# Patient Record
Sex: FEMALE | Race: WHITE
[De-identification: names, ages, dates, MRNs, and addresses within clinical notes are randomized per-mention and may not be internally consistent; named-entity substitution may affect disease eponyms.]

---

## 2018-03-14 ENCOUNTER — HOSPITAL ENCOUNTER (OUTPATIENT)
Dept: HOSPITAL 95 - ER | Age: 33
Setting detail: OBSERVATION
Discharge: HOME | End: 2018-03-14
Attending: SURGERY | Admitting: SURGERY
Payer: COMMERCIAL

## 2018-03-14 VITALS — HEIGHT: 65 IN | BODY MASS INDEX: 39.32 KG/M2 | WEIGHT: 236 LBS

## 2018-03-14 DIAGNOSIS — F31.9: ICD-10-CM

## 2018-03-14 DIAGNOSIS — F41.9: ICD-10-CM

## 2018-03-14 DIAGNOSIS — E11.9: ICD-10-CM

## 2018-03-14 DIAGNOSIS — Z88.8: ICD-10-CM

## 2018-03-14 DIAGNOSIS — Z79.899: ICD-10-CM

## 2018-03-14 DIAGNOSIS — F17.210: ICD-10-CM

## 2018-03-14 DIAGNOSIS — K80.12: Primary | ICD-10-CM

## 2018-03-14 DIAGNOSIS — E28.2: ICD-10-CM

## 2018-03-14 DIAGNOSIS — Z88.5: ICD-10-CM

## 2018-03-14 DIAGNOSIS — A63.0: ICD-10-CM

## 2018-03-14 DIAGNOSIS — Z98.890: ICD-10-CM

## 2018-03-14 LAB
ALBUMIN SERPL BCP-MCNC: 3.4 G/DL (ref 3.4–5)
ALBUMIN/GLOB SERPL: 0.9 {RATIO} (ref 0.8–1.8)
ALT SERPL W P-5'-P-CCNC: 19 U/L (ref 12–78)
ANION GAP SERPL CALCULATED.4IONS-SCNC: 9 MMOL/L (ref 6–16)
AST SERPL W P-5'-P-CCNC: 8 U/L (ref 12–37)
BASOPHILS # BLD AUTO: 0.05 K/MM3 (ref 0–0.23)
BASOPHILS NFR BLD AUTO: 1 % (ref 0–2)
BILIRUB SERPL-MCNC: 0.1 MG/DL (ref 0.1–1)
BUN SERPL-MCNC: 16 MG/DL (ref 8–24)
CALCIUM SERPL-MCNC: 8.8 MG/DL (ref 8.5–10.1)
CHLORIDE SERPL-SCNC: 105 MMOL/L (ref 98–108)
CO2 SERPL-SCNC: 24 MMOL/L (ref 21–32)
CREAT SERPL-MCNC: 0.61 MG/DL (ref 0.4–1)
DEPRECATED RDW RBC AUTO: 43.1 FL (ref 35.1–46.3)
EOSINOPHIL # BLD AUTO: 0.07 K/MM3 (ref 0–0.68)
EOSINOPHIL NFR BLD AUTO: 1 % (ref 0–6)
ERYTHROCYTE [DISTWIDTH] IN BLOOD BY AUTOMATED COUNT: 14.4 % (ref 11.7–14.2)
GLOBULIN SER CALC-MCNC: 3.9 G/DL (ref 2.2–4)
GLUCOSE SERPL-MCNC: 106 MG/DL (ref 70–99)
HCT VFR BLD AUTO: 38.8 % (ref 33–51)
HGB BLD-MCNC: 12.5 G/DL (ref 11.5–16)
IMM GRANULOCYTES # BLD AUTO: 0.01 K/MM3 (ref 0–0.1)
IMM GRANULOCYTES NFR BLD AUTO: 0 % (ref 0–1)
LYMPHOCYTES # BLD AUTO: 2.83 K/MM3 (ref 0.84–5.2)
LYMPHOCYTES NFR BLD AUTO: 34 % (ref 21–46)
MCHC RBC AUTO-ENTMCNC: 32.2 G/DL (ref 31.5–36.5)
MCV RBC AUTO: 83 FL (ref 80–100)
MONOCYTES # BLD AUTO: 0.54 K/MM3 (ref 0.16–1.47)
MONOCYTES NFR BLD AUTO: 7 % (ref 4–13)
NEUTROPHILS # BLD AUTO: 4.87 K/MM3 (ref 1.96–9.15)
NEUTROPHILS NFR BLD AUTO: 58 % (ref 41–73)
NRBC # BLD AUTO: 0 K/MM3 (ref 0–0.02)
NRBC BLD AUTO-RTO: 0 /100 WBC (ref 0–0.2)
PLATELET # BLD AUTO: 324 K/MM3 (ref 150–400)
POTASSIUM SERPL-SCNC: 3.9 MMOL/L (ref 3.5–5.5)
PROT SERPL-MCNC: 7.3 G/DL (ref 6.4–8.2)
SODIUM SERPL-SCNC: 138 MMOL/L (ref 136–145)

## 2018-03-14 PROCEDURE — C1729 CATH, DRAINAGE: HCPCS

## 2018-03-14 PROCEDURE — 0FT44ZZ RESECTION OF GALLBLADDER, PERCUTANEOUS ENDOSCOPIC APPROACH: ICD-10-PCS | Performed by: SURGERY

## 2018-03-14 PROCEDURE — BF13YZZ FLUOROSCOPY OF GALLBLADDER AND BILE DUCTS USING OTHER CONTRAST: ICD-10-PCS | Performed by: SURGERY

## 2018-03-14 PROCEDURE — G0378 HOSPITAL OBSERVATION PER HR: HCPCS

## 2020-12-03 ENCOUNTER — HOSPITAL ENCOUNTER (OUTPATIENT)
Dept: HOSPITAL 95 - LAB | Age: 35
Discharge: HOME | End: 2020-12-03
Attending: OBSTETRICS & GYNECOLOGY
Payer: COMMERCIAL

## 2020-12-03 DIAGNOSIS — O09.522: Primary | ICD-10-CM

## 2020-12-03 DIAGNOSIS — O09.292: ICD-10-CM

## 2020-12-03 DIAGNOSIS — O24.112: ICD-10-CM

## 2020-12-03 LAB
BASOPHILS # BLD AUTO: 0.04 K/MM3 (ref 0–0.23)
BASOPHILS NFR BLD AUTO: 0 % (ref 0–2)
DEPRECATED RDW RBC AUTO: 45.3 FL (ref 35.1–46.3)
EOSINOPHIL # BLD AUTO: 0.17 K/MM3 (ref 0–0.68)
EOSINOPHIL NFR BLD AUTO: 1 % (ref 0–6)
ERYTHROCYTE [DISTWIDTH] IN BLOOD BY AUTOMATED COUNT: 13.9 % (ref 11.7–14.2)
HCT VFR BLD AUTO: 36.9 % (ref 33–51)
HGB BLD-MCNC: 11.6 G/DL (ref 11.5–16)
IMM GRANULOCYTES # BLD AUTO: 0.17 K/MM3 (ref 0–0.1)
IMM GRANULOCYTES NFR BLD AUTO: 1 % (ref 0–1)
LYMPHOCYTES # BLD AUTO: 3.52 K/MM3 (ref 0.84–5.2)
LYMPHOCYTES NFR BLD AUTO: 24 % (ref 21–46)
MCHC RBC AUTO-ENTMCNC: 31.4 G/DL (ref 31.5–36.5)
MCV RBC AUTO: 90 FL (ref 80–100)
MONOCYTES # BLD AUTO: 1.15 K/MM3 (ref 0.16–1.47)
MONOCYTES NFR BLD AUTO: 8 % (ref 4–13)
NEUTROPHILS # BLD AUTO: 9.45 K/MM3 (ref 1.96–9.15)
NEUTROPHILS NFR BLD AUTO: 65 % (ref 41–73)
NRBC # BLD AUTO: 0 K/MM3 (ref 0–0.02)
NRBC BLD AUTO-RTO: 0 /100 WBC (ref 0–0.2)
PLATELET # BLD AUTO: 263 K/MM3 (ref 150–400)

## 2021-02-09 ENCOUNTER — HOSPITAL ENCOUNTER (INPATIENT)
Dept: HOSPITAL 95 - BC | Age: 36
LOS: 2 days | Discharge: HOME | End: 2021-02-11
Attending: OBSTETRICS & GYNECOLOGY | Admitting: OBSTETRICS & GYNECOLOGY
Payer: COMMERCIAL

## 2021-02-09 VITALS — BODY MASS INDEX: 46.15 KG/M2 | HEIGHT: 65 IN | WEIGHT: 276.99 LBS

## 2021-02-09 DIAGNOSIS — E11.9: ICD-10-CM

## 2021-02-09 DIAGNOSIS — E66.9: ICD-10-CM

## 2021-02-09 DIAGNOSIS — Z3A.38: ICD-10-CM

## 2021-02-09 DIAGNOSIS — Z87.891: ICD-10-CM

## 2021-02-09 DIAGNOSIS — Z20.822: ICD-10-CM

## 2021-02-09 LAB
BASOPHILS # BLD AUTO: 0.04 K/MM3 (ref 0–0.23)
BASOPHILS NFR BLD AUTO: 0 % (ref 0–2)
DEPRECATED RDW RBC AUTO: 45.8 FL (ref 35.1–46.3)
EOSINOPHIL # BLD AUTO: 0.2 K/MM3 (ref 0–0.68)
EOSINOPHIL NFR BLD AUTO: 2 % (ref 0–6)
ERYTHROCYTE [DISTWIDTH] IN BLOOD BY AUTOMATED COUNT: 14.7 % (ref 11.7–14.2)
HCT VFR BLD AUTO: 34.5 % (ref 33–51)
HGB BLD-MCNC: 11.3 G/DL (ref 11.5–16)
IMM GRANULOCYTES # BLD AUTO: 0.12 K/MM3 (ref 0–0.1)
IMM GRANULOCYTES NFR BLD AUTO: 1 % (ref 0–1)
LYMPHOCYTES # BLD AUTO: 2.86 K/MM3 (ref 0.84–5.2)
LYMPHOCYTES NFR BLD AUTO: 23 % (ref 21–46)
MCHC RBC AUTO-ENTMCNC: 32.8 G/DL (ref 31.5–36.5)
MCV RBC AUTO: 85 FL (ref 80–100)
MONOCYTES # BLD AUTO: 0.98 K/MM3 (ref 0.16–1.47)
MONOCYTES NFR BLD AUTO: 8 % (ref 4–13)
NEUTROPHILS # BLD AUTO: 8.11 K/MM3 (ref 1.96–9.15)
NEUTROPHILS NFR BLD AUTO: 66 % (ref 41–73)
NRBC # BLD AUTO: 0 K/MM3 (ref 0–0.02)
NRBC BLD AUTO-RTO: 0 /100 WBC (ref 0–0.2)
PLATELET # BLD AUTO: 243 K/MM3 (ref 150–400)

## 2021-02-09 PROCEDURE — 0KQM0ZZ REPAIR PERINEUM MUSCLE, OPEN APPROACH: ICD-10-PCS | Performed by: OBSTETRICS & GYNECOLOGY

## 2021-02-09 PROCEDURE — A9270 NON-COVERED ITEM OR SERVICE: HCPCS

## 2021-02-09 NOTE — NUR
LATE ENTRY:RN/LC ROUNDED TO HELP W/ BREASTFEEDING. PT STATES SHE HAS NOT BEEN
SUCCESSFUL W/ BREASTFEEDING IN THE PAST BUT WOULD LIKE TO TRY. INSTRUCT/DEMO
HAND EXPRESSION TO REMOVE COLOSTRUM AND GET TO NB (IN SCN). INSTRUCTED PT ON
CORRECT PUMP SETTINGS AND FREQUENCY OF PUMPING. PT VERBALIZED UNDERSTANDING,
DENIES ANY FURTHER QUESTIONS OR CONCERNS.

## 2021-02-10 LAB
DEPRECATED RDW RBC AUTO: 46.5 FL (ref 35.1–46.3)
ERYTHROCYTE [DISTWIDTH] IN BLOOD BY AUTOMATED COUNT: 14.8 % (ref 11.7–14.2)
HCT VFR BLD AUTO: 33.8 % (ref 33–51)
HGB BLD-MCNC: 10.8 G/DL (ref 11.5–16)
MCHC RBC AUTO-ENTMCNC: 32 G/DL (ref 31.5–36.5)
MCV RBC AUTO: 87 FL (ref 80–100)
NRBC # BLD AUTO: 0 K/MM3 (ref 0–0.02)
NRBC BLD AUTO-RTO: 0 /100 WBC (ref 0–0.2)
PLATELET # BLD AUTO: 239 K/MM3 (ref 150–400)

## 2021-02-11 NOTE — NUR
DISCHARGE
 
MOTHER EDUCATED ON AND RECEIVED PRINTED DISCHARGE INSTRUCTIONS AND VERBALIZED
AN UNDERSTANDING. IV PREVIOUSLY DC'D. GATHERING PERSONAL BELONGINGS. PPFU
SCHEDULED. PLANNING TO DISCHARGE HOME LATER THIS MORNING.

## 2021-02-16 ENCOUNTER — HOSPITAL ENCOUNTER (EMERGENCY)
Dept: HOSPITAL 95 - ER | Age: 36
Discharge: HOME | End: 2021-02-16
Payer: COMMERCIAL

## 2021-02-16 VITALS — WEIGHT: 259.99 LBS | HEIGHT: 65 IN | BODY MASS INDEX: 43.32 KG/M2

## 2021-02-16 DIAGNOSIS — E11.9: ICD-10-CM

## 2021-02-16 DIAGNOSIS — Z79.84: ICD-10-CM

## 2021-02-16 DIAGNOSIS — R11.0: ICD-10-CM

## 2021-02-16 DIAGNOSIS — Z79.899: ICD-10-CM

## 2021-02-16 DIAGNOSIS — Z88.5: ICD-10-CM

## 2021-02-16 DIAGNOSIS — F17.200: ICD-10-CM

## 2021-02-16 DIAGNOSIS — R07.9: ICD-10-CM

## 2021-02-16 DIAGNOSIS — R42: ICD-10-CM

## 2021-02-16 DIAGNOSIS — Z88.8: ICD-10-CM

## 2021-02-16 LAB
ALBUMIN SERPL BCP-MCNC: 2.2 G/DL (ref 3.4–5)
ALBUMIN/GLOB SERPL: 0.6 {RATIO} (ref 0.8–1.8)
ALT SERPL W P-5'-P-CCNC: 24 U/L (ref 12–78)
ANION GAP SERPL CALCULATED.4IONS-SCNC: 7 MMOL/L (ref 6–16)
AST SERPL W P-5'-P-CCNC: 12 U/L (ref 12–37)
BASOPHILS # BLD AUTO: 0.07 K/MM3 (ref 0–0.23)
BASOPHILS NFR BLD AUTO: 1 % (ref 0–2)
BILIRUB SERPL-MCNC: 0.1 MG/DL (ref 0.1–1)
BUN SERPL-MCNC: 16 MG/DL (ref 8–24)
CALCIUM SERPL-MCNC: 9 MG/DL (ref 8.5–10.1)
CHLORIDE SERPL-SCNC: 106 MMOL/L (ref 98–108)
CO2 SERPL-SCNC: 27 MMOL/L (ref 21–32)
CREAT SERPL-MCNC: 0.73 MG/DL (ref 0.4–1)
DEPRECATED RDW RBC AUTO: 46.2 FL (ref 35.1–46.3)
EOSINOPHIL # BLD AUTO: 0.15 K/MM3 (ref 0–0.68)
EOSINOPHIL NFR BLD AUTO: 2 % (ref 0–6)
ERYTHROCYTE [DISTWIDTH] IN BLOOD BY AUTOMATED COUNT: 14.6 % (ref 11.7–14.2)
GLOBULIN SER CALC-MCNC: 3.6 G/DL (ref 2.2–4)
GLUCOSE SERPL-MCNC: 92 MG/DL (ref 70–99)
HCT VFR BLD AUTO: 32.7 % (ref 33–51)
HGB BLD-MCNC: 10.6 G/DL (ref 11.5–16)
IMM GRANULOCYTES # BLD AUTO: 0.11 K/MM3 (ref 0–0.1)
IMM GRANULOCYTES NFR BLD AUTO: 1 % (ref 0–1)
LYMPHOCYTES # BLD AUTO: 3.13 K/MM3 (ref 0.84–5.2)
LYMPHOCYTES NFR BLD AUTO: 33 % (ref 21–46)
MCHC RBC AUTO-ENTMCNC: 32.4 G/DL (ref 31.5–36.5)
MCV RBC AUTO: 86 FL (ref 80–100)
MONOCYTES # BLD AUTO: 0.71 K/MM3 (ref 0.16–1.47)
MONOCYTES NFR BLD AUTO: 8 % (ref 4–13)
NEUTROPHILS # BLD AUTO: 5.19 K/MM3 (ref 1.96–9.15)
NEUTROPHILS NFR BLD AUTO: 56 % (ref 41–73)
NRBC # BLD AUTO: 0 K/MM3 (ref 0–0.02)
NRBC BLD AUTO-RTO: 0 /100 WBC (ref 0–0.2)
PLATELET # BLD AUTO: 302 K/MM3 (ref 150–400)
POTASSIUM SERPL-SCNC: 3.8 MMOL/L (ref 3.5–5.5)
PROT SERPL-MCNC: 5.8 G/DL (ref 6.4–8.2)
SODIUM SERPL-SCNC: 140 MMOL/L (ref 136–145)
TROPONIN I SERPL-MCNC: <0.015 NG/ML (ref 0–0.04)

## 2021-03-25 ENCOUNTER — HOSPITAL ENCOUNTER (OUTPATIENT)
Dept: HOSPITAL 95 - ORSCSDS | Age: 36
Discharge: HOME | End: 2021-03-25
Attending: OBSTETRICS & GYNECOLOGY
Payer: COMMERCIAL

## 2021-03-25 VITALS — BODY MASS INDEX: 43.43 KG/M2 | HEIGHT: 64.02 IN | WEIGHT: 254.41 LBS

## 2021-03-25 DIAGNOSIS — E11.9: ICD-10-CM

## 2021-03-25 DIAGNOSIS — Z30.2: Primary | ICD-10-CM

## 2021-03-25 DIAGNOSIS — Z79.84: ICD-10-CM

## 2021-03-25 DIAGNOSIS — E66.01: ICD-10-CM

## 2021-03-25 DIAGNOSIS — Z79.899: ICD-10-CM

## 2021-03-25 PROCEDURE — 0UT74ZZ RESECTION OF BILATERAL FALLOPIAN TUBES, PERCUTANEOUS ENDOSCOPIC APPROACH: ICD-10-PCS | Performed by: OBSTETRICS & GYNECOLOGY

## 2021-03-25 NOTE — NUR
03/25/21 1217 Chantelle Negro
ABDOMINAL AREA PREPPED BY [facility] W/ DURAPREP.
LAUREN AREA PREPPED BY [facility]JACKIE WITH BETADINE SOLUTION.
 
BUPIVACAINE 0.5% 30 MLS MIXED WITH EPI 0.15ML PER ORDER TO MAKE
BUPIVACAINE 0.5% 1:200,000 FOR INJECTION AT [facility] BY DR. DODSON. 10
[facility]

## 2021-08-01 ENCOUNTER — HOSPITAL ENCOUNTER (OUTPATIENT)
Dept: HOSPITAL 95 - ER | Age: 36
Setting detail: OBSERVATION
LOS: 2 days | Discharge: HOME | End: 2021-08-03
Attending: INTERNAL MEDICINE | Admitting: INTERNAL MEDICINE
Payer: COMMERCIAL

## 2021-08-01 VITALS — BODY MASS INDEX: 41.65 KG/M2 | HEIGHT: 65 IN | WEIGHT: 250 LBS

## 2021-08-01 DIAGNOSIS — Z88.5: ICD-10-CM

## 2021-08-01 DIAGNOSIS — K21.9: ICD-10-CM

## 2021-08-01 DIAGNOSIS — R07.9: ICD-10-CM

## 2021-08-01 DIAGNOSIS — E11.9: ICD-10-CM

## 2021-08-01 DIAGNOSIS — Z88.8: ICD-10-CM

## 2021-08-01 DIAGNOSIS — Z79.84: ICD-10-CM

## 2021-08-01 DIAGNOSIS — G92: ICD-10-CM

## 2021-08-01 DIAGNOSIS — R11.10: ICD-10-CM

## 2021-08-01 DIAGNOSIS — F17.210: ICD-10-CM

## 2021-08-01 DIAGNOSIS — F15.10: ICD-10-CM

## 2021-08-01 DIAGNOSIS — F31.89: Primary | ICD-10-CM

## 2021-08-01 DIAGNOSIS — N17.9: ICD-10-CM

## 2021-08-01 LAB
ALBUMIN SERPL BCP-MCNC: 3.5 G/DL (ref 3.4–5)
ALBUMIN/GLOB SERPL: 0.9 {RATIO} (ref 0.8–1.8)
ALT SERPL W P-5'-P-CCNC: 24 U/L (ref 12–78)
AMPHETAMINES UR-MCNC: DETECTED UG/L
ANION GAP SERPL CALCULATED.4IONS-SCNC: 10 MMOL/L (ref 6–16)
ANION GAP SERPL CALCULATED.4IONS-SCNC: 12 MMOL/L (ref 6–16)
ANION GAP SERPL CALCULATED.4IONS-SCNC: 7 MMOL/L (ref 6–16)
ANION GAP SERPL CALCULATED.4IONS-SCNC: 8 MMOL/L (ref 6–16)
APAP SERPL-MCNC: <2 UG/ML (ref 10–30)
AST SERPL W P-5'-P-CCNC: 14 U/L (ref 12–37)
BASOPHILS # BLD AUTO: 0.08 K/MM3 (ref 0–0.23)
BASOPHILS NFR BLD AUTO: 1 % (ref 0–2)
BILIRUB SERPL-MCNC: 0.4 MG/DL (ref 0.1–1)
BUN SERPL-MCNC: 16 MG/DL (ref 8–24)
BUN SERPL-MCNC: 17 MG/DL (ref 8–24)
CALCIUM SERPL-MCNC: 8.6 MG/DL (ref 8.5–10.1)
CALCIUM SERPL-MCNC: 8.6 MG/DL (ref 8.5–10.1)
CALCIUM SERPL-MCNC: 8.9 MG/DL (ref 8.5–10.1)
CALCIUM SERPL-MCNC: 9.1 MG/DL (ref 8.5–10.1)
CHLORIDE SERPL-SCNC: 109 MMOL/L (ref 98–108)
CHLORIDE SERPL-SCNC: 111 MMOL/L (ref 98–108)
CK MB CFR SERPL CALC: 1.6 % (ref 0–4)
CK SERPL-CCNC: 155 U/L (ref 26–193)
CO2 SERPL-SCNC: 19 MMOL/L (ref 21–32)
CO2 SERPL-SCNC: 23 MMOL/L (ref 21–32)
CO2 SERPL-SCNC: 24 MMOL/L (ref 21–32)
CO2 SERPL-SCNC: 26 MMOL/L (ref 21–32)
CREAT SERPL-MCNC: 1.56 MG/DL (ref 0.4–1)
CREAT SERPL-MCNC: 1.64 MG/DL (ref 0.4–1)
CREAT SERPL-MCNC: 1.66 MG/DL (ref 0.4–1)
CREAT SERPL-MCNC: 1.69 MG/DL (ref 0.4–1)
DEPRECATED RDW RBC AUTO: 46.4 FL (ref 35.1–46.3)
EOSINOPHIL # BLD AUTO: 0.11 K/MM3 (ref 0–0.68)
EOSINOPHIL NFR BLD AUTO: 1 % (ref 0–6)
ERYTHROCYTE [DISTWIDTH] IN BLOOD BY AUTOMATED COUNT: 14.5 % (ref 11.7–14.2)
ETHANOL SERPL-MCNC: <3 MG/DL
GLOBULIN SER CALC-MCNC: 3.7 G/DL (ref 2.2–4)
GLUCOSE SERPL-MCNC: 101 MG/DL (ref 70–99)
GLUCOSE SERPL-MCNC: 105 MG/DL (ref 70–99)
GLUCOSE SERPL-MCNC: 109 MG/DL (ref 70–99)
GLUCOSE SERPL-MCNC: 110 MG/DL (ref 70–99)
GLUCOSE UR-MCNC: (no result) MG/DL
HCT VFR BLD AUTO: 38.1 % (ref 33–51)
HGB BLD-MCNC: 12.6 G/DL (ref 11.5–16)
IMM GRANULOCYTES # BLD AUTO: 0.06 K/MM3 (ref 0–0.1)
IMM GRANULOCYTES NFR BLD AUTO: 0 % (ref 0–1)
LEUKOCYTE ESTERASE UR QL STRIP: (no result)
LYMPHOCYTES # BLD AUTO: 4.01 K/MM3 (ref 0.84–5.2)
LYMPHOCYTES NFR BLD AUTO: 26 % (ref 21–46)
MCHC RBC AUTO-ENTMCNC: 33.1 G/DL (ref 31.5–36.5)
MCV RBC AUTO: 88 FL (ref 80–100)
MONOCYTES # BLD AUTO: 1.05 K/MM3 (ref 0.16–1.47)
MONOCYTES NFR BLD AUTO: 7 % (ref 4–13)
NEUTROPHILS # BLD AUTO: 10.06 K/MM3 (ref 1.96–9.15)
NEUTROPHILS NFR BLD AUTO: 66 % (ref 41–73)
NRBC # BLD AUTO: 0 K/MM3 (ref 0–0.02)
NRBC BLD AUTO-RTO: 0 /100 WBC (ref 0–0.2)
PH BLDV: 7.44 [PH] (ref 7.34–7.37)
PLATELET # BLD AUTO: 331 K/MM3 (ref 150–400)
POTASSIUM SERPL-SCNC: 3.6 MMOL/L (ref 3.5–5.5)
POTASSIUM SERPL-SCNC: 3.6 MMOL/L (ref 3.5–5.5)
POTASSIUM SERPL-SCNC: 3.7 MMOL/L (ref 3.5–5.5)
POTASSIUM SERPL-SCNC: 4.1 MMOL/L (ref 3.5–5.5)
PROT SERPL-MCNC: 7.2 G/DL (ref 6.4–8.2)
PROT UR STRIP-MCNC: (no result) MG/DL
RBC #/AREA URNS HPF: (no result) /HPF (ref 0–2)
SALICYLATES SERPL-MCNC: <1.7 MG/DL (ref 2.8–20)
SODIUM SERPL-SCNC: 141 MMOL/L (ref 136–145)
SODIUM SERPL-SCNC: 142 MMOL/L (ref 136–145)
SP GR SPEC: 1.01 (ref 1–1.02)
UROBILINOGEN UR STRIP-MCNC: (no result) MG/DL
WBC #/AREA URNS HPF: (no result) /HPF (ref 0–5)

## 2021-08-01 PROCEDURE — A9270 NON-COVERED ITEM OR SERVICE: HCPCS

## 2021-08-01 PROCEDURE — G0480 DRUG TEST DEF 1-7 CLASSES: HCPCS

## 2021-08-01 PROCEDURE — G0378 HOSPITAL OBSERVATION PER HR: HCPCS

## 2021-08-01 NOTE — NUR
MANUELA FROM [facility]ISON [facility] CALLED TO SAY THEY ARE CLOSING THIER CASE ON THE
PATIENT DUE TO VITAL SIGNS BEING STABLE; PATIENT BEING ABLE TO EAT A SANDWICH
AND DRINK WATER; ORIENTATION CLEARING UP.

## 2021-08-01 NOTE — NUR
PATIENT ON PHONE WITH MOTHER AND REPORTS "LIKE SOME BAD DRUGS, YA KNOW";
REPORTS SHE TOOK SOME CHARCOAL PILLS MIXED IN MILK AND "THAT IS WHAT REALLY
SAVED ME".

## 2021-08-01 NOTE — NUR
ASSUMED CARE OF PATIENT AT APPROXIMATELY 1900 FROM KWASI POSADAS RN.  PATIENT
ALERT AND ORIENTED X4; APPEARS DEPRESSED.  PATIENT REPORTS PAIN IN BED; ABLE
TO REST IN BED.  ONE ASSIST TO BATHROOM.  HIGH SI PRECAUTIONS; 1:1 SITTER IN
ROOM.  PATIENT DENIES NAUSEA OR DIZZINESS.  ST ON TELE; OXYGEN SATURATION
ABOVE 90% ON ROOM AIR.  NS INFUSING PER ORDER.

## 2021-08-01 NOTE — NUR
MANUELA FROM [facility] CALLED BACK TO REPORT SHE WILL KEEP CASE OPEN DUE
TO CREATININE; WILL CALL IN MORNING FOR CREATININE LEVEL.

## 2021-08-01 NOTE — NUR
[facility] CALLED TO REPORT PATIENT'S METHANOL IS NEGATIVE; NO NEED FOR
2000 DRUG; NO MEDICATION SCHEDULED FOR 2000; THEY WILL CALL BACK IN A FEW
HOURS FOR UPDATE.

## 2021-08-01 NOTE — NUR
SHIFT SUMMARY;
 
ASSUMED CARE FROM ER. SITTER PRESENT AT BEDSIDE, CAMERA MONITORING IN PLACE.
SLEEPY THROUGHOUT SHIFT BUT WAKES EASILY. REPORTS USING METH THIS AM AND
UNCLEAR IF SWALLOWED ANTIFREEZE OR TOOK METFORMIN FOR INTENTIONAL OD.
ETHYNOL GLYCOL LABS PENDING ON ADMIT. VSS, CALM AND COOPERATIVE. ROOM SECURITY
CHECKED AND BELONGINGS LOCKED IN CLOSET. LAB RESULTS IN AFTERNOON SHOWED NO
ETHYLENE GLYCOL IN BLOOD WORK. STATES ATTEMPTED OD DUE TO PAIN. HX OF BIPOLAR.
CURRENTLY LIVES AT HOME WITH MOTHER. WILL CONTINUE MONITORING SITTER. REPORT
TO NIGHT SHIFT FOR CHANGE OF SHIFT.

## 2021-08-02 LAB
ALBUMIN SERPL BCP-MCNC: 2.9 G/DL (ref 3.4–5)
ALBUMIN/GLOB SERPL: 0.8 {RATIO} (ref 0.8–1.8)
ALT SERPL W P-5'-P-CCNC: 23 U/L (ref 12–78)
ANION GAP SERPL CALCULATED.4IONS-SCNC: 6 MMOL/L (ref 6–16)
AST SERPL W P-5'-P-CCNC: 12 U/L (ref 12–37)
BILIRUB SERPL-MCNC: 0.2 MG/DL (ref 0.1–1)
BUN SERPL-MCNC: 18 MG/DL (ref 8–24)
CALCIUM SERPL-MCNC: 7.7 MG/DL (ref 8.5–10.1)
CHLORIDE SERPL-SCNC: 110 MMOL/L (ref 98–108)
CO2 SERPL-SCNC: 24 MMOL/L (ref 21–32)
CREAT SERPL-MCNC: 0.98 MG/DL (ref 0.4–1)
GLOBULIN SER CALC-MCNC: 3.6 G/DL (ref 2.2–4)
GLUCOSE SERPL-MCNC: 92 MG/DL (ref 70–99)
MAGNESIUM SERPL-MCNC: 2.2 MG/DL (ref 1.6–2.4)
PHOSPHATE SERPL-MCNC: 3.1 MG/DL (ref 2.5–4.9)
POTASSIUM SERPL-SCNC: 3.5 MMOL/L (ref 3.5–5.5)
PROT SERPL-MCNC: 6.5 G/DL (ref 6.4–8.2)
SODIUM SERPL-SCNC: 140 MMOL/L (ref 136–145)

## 2021-08-02 NOTE — NUR
SHIFT SUMMARY
PT TRANSFERRED TO UNIT FROM PCU 3 THIS SHIFT. RECEIVED REPORT FROM KASANDRA ENRIQUEZ.
PT AAOX4, ABLE TO MAKE NEEDS KNOWN, PLEASANT AND COOPERATIVE TO CARE. SBA TO
BATHROOM. PT DENIED ANY SUICIDAL IDEATIONS, DENIES FEELING OF INCREASE ANXIETY
/ DEPRESSION AT THIS TIME. PT CONTINUES ON LOW SUICIDAL PRECAUTIONS. ROOM
INSPECTED BY THIS RN PRIOR TO PT TRANSFER. PT CALM AND COMFORTABLE IN ROOM AT
THIS TIME. BED AT LOWEST POSITION. CALL LIGHT WITHIN REACH.

## 2021-08-02 NOTE — NUR
TRANSFER TO MEDICAL FLOOR
PT MET TODAY WITH DR. GARCIA AND HER SI STATUS WAS DOWNGRADED TO LOW. PT
REPORTED HAVING NO FEELINGS/THOUGHTS OR PLANS OF SUICIDE TODAY. PT WAS
LETHARGIC BUT AROUSABLE AND ORIENTED. VS STABLE, PT ON RA. POISON CONTROL WAS
FOLLOWING BUT SIGNED OFF TODAY SINCE KIDNEY FUNCTION HAD IMPROVED. 1:1 SITTER
WAS DISCONTINUED TODAY AS SUICIDE RISK/PRECAUTIONS WERE LESSENED. PT WAS
TRANSFERRED TO MEDICAL FLOOR  REPORT GIVEN TO KASANDRA PAVON

## 2021-08-03 NOTE — NUR
DISCHARGE SUMMARY
PT DISCHARGE TO HOME THIS SHIFT. AT APPROX 1710. PT's  PRESENT DURING
D/C PROCESS. PT VERBALIZED UNDERSTANDING OF DISCHARGE TEACHINGS AND
INSTRUCTIONS. PT AAOX4, ABLE TO MAKE NEEDS KNOWN, PLEASANT AND COOPERATIVE TO
CARE. PT DENIES ANY SUICIAL IDEATIONS OR PLANS T/O SHIFT. PT PLEASANT AND
COOPERATIVE TO CARE T/O SHIFT. IV LINE AND TELE DISCONTINUED PRIOR TO D/C. NO
C/O PAIN OR ANY DISCOMFORT THIS SHIFT, NO CONCERNS OR ISSUES NOTED FROM PT
PRIOR TO D/C. DISCHARGE PACKET GIVEN TO PT UPON DISCHARGE.

## 2021-08-03 NOTE — NUR
SHIFT SUMMARY
AOX4. VSS. TELE ST @116. DENIES PAIN, N/V OR SOB. DENIES ANY SI THOUGHTS OR
PLANS. DROWSY, STATES SHES "TIRED". WITHDRAWN. FLAT AFFECT. TEARFUL THIS AM.
STATES SHE WANTS TO GO HOME TO HER CHILDREN. PT ON MENSES. CONTINENT OF URINE.
NS @100ML/HR. CALL LIGHT IN REACH, [facility].

## 2022-07-15 ENCOUNTER — HOSPITAL ENCOUNTER (EMERGENCY)
Dept: HOSPITAL 95 - ER | Age: 37
Discharge: LEFT BEFORE DISCHARGE | End: 2022-07-15
Payer: COMMERCIAL

## 2022-07-15 VITALS — HEIGHT: 65 IN | BODY MASS INDEX: 39.99 KG/M2 | WEIGHT: 240 LBS

## 2022-07-15 DIAGNOSIS — Z53.21: ICD-10-CM

## 2022-07-15 DIAGNOSIS — R11.2: Primary | ICD-10-CM

## 2022-07-15 DIAGNOSIS — U07.1: ICD-10-CM

## 2022-07-15 DIAGNOSIS — R19.7: ICD-10-CM

## 2022-07-15 LAB
ALBUMIN SERPL BCP-MCNC: 3.7 G/DL (ref 3.4–5)
ALBUMIN/GLOB SERPL: 1.1 {RATIO} (ref 0.8–1.8)
ALT SERPL W P-5'-P-CCNC: 26 U/L (ref 12–78)
ANION GAP SERPL CALCULATED.4IONS-SCNC: 10 MMOL/L (ref 6–16)
AST SERPL W P-5'-P-CCNC: 18 U/L (ref 12–37)
BASOPHILS # BLD AUTO: 0.03 K/MM3 (ref 0–0.23)
BASOPHILS NFR BLD AUTO: 1 % (ref 0–2)
BILIRUB SERPL-MCNC: 0.2 MG/DL (ref 0.1–1)
BUN SERPL-MCNC: 10 MG/DL (ref 8–24)
CALCIUM SERPL-MCNC: 9.6 MG/DL (ref 8.5–10.1)
CHLORIDE SERPL-SCNC: 103 MMOL/L (ref 98–108)
CO2 SERPL-SCNC: 23 MMOL/L (ref 21–32)
CREAT SERPL-MCNC: 0.79 MG/DL (ref 0.4–1)
DEPRECATED RDW RBC AUTO: 44.5 FL (ref 35.1–46.3)
EOSINOPHIL # BLD AUTO: 0.02 K/MM3 (ref 0–0.68)
EOSINOPHIL NFR BLD AUTO: 0 % (ref 0–6)
ERYTHROCYTE [DISTWIDTH] IN BLOOD BY AUTOMATED COUNT: 14 % (ref 11.7–14.2)
FLUAV RNA SPEC QL NAA+PROBE: NEGATIVE
FLUBV RNA SPEC QL NAA+PROBE: NEGATIVE
GLOBULIN SER CALC-MCNC: 3.4 G/DL (ref 2.2–4)
GLUCOSE SERPL-MCNC: 105 MG/DL (ref 70–99)
HCT VFR BLD AUTO: 40.1 % (ref 33–51)
HGB BLD-MCNC: 13.2 G/DL (ref 11.5–16)
IMM GRANULOCYTES # BLD AUTO: 0.02 K/MM3 (ref 0–0.1)
IMM GRANULOCYTES NFR BLD AUTO: 0 % (ref 0–1)
LYMPHOCYTES # BLD AUTO: 0.56 K/MM3 (ref 0.84–5.2)
LYMPHOCYTES NFR BLD AUTO: 12 % (ref 21–46)
MCHC RBC AUTO-ENTMCNC: 32.9 G/DL (ref 31.5–36.5)
MCV RBC AUTO: 87 FL (ref 80–100)
MONOCYTES # BLD AUTO: 0.69 K/MM3 (ref 0.16–1.47)
MONOCYTES NFR BLD AUTO: 15 % (ref 4–13)
NEUTROPHILS # BLD AUTO: 3.3 K/MM3 (ref 1.96–9.15)
NEUTROPHILS NFR BLD AUTO: 72 % (ref 41–73)
NRBC # BLD AUTO: 0 K/MM3 (ref 0–0.02)
NRBC BLD AUTO-RTO: 0 /100 WBC (ref 0–0.2)
PLATELET # BLD AUTO: 253 K/MM3 (ref 150–400)
POTASSIUM SERPL-SCNC: 3.8 MMOL/L (ref 3.5–5.5)
PROT SERPL-MCNC: 7.1 G/DL (ref 6.4–8.2)
RSV RNA SPEC QL NAA+PROBE: NEGATIVE
SARS-COV-2 RNA RESP QL NAA+PROBE: POSITIVE
SODIUM SERPL-SCNC: 136 MMOL/L (ref 136–145)

## 2023-05-29 ENCOUNTER — HOSPITAL ENCOUNTER (OUTPATIENT)
Dept: HOSPITAL 95 - ER | Age: 38
Setting detail: OBSERVATION
LOS: 1 days | Discharge: HOME | End: 2023-05-30
Attending: STUDENT IN AN ORGANIZED HEALTH CARE EDUCATION/TRAINING PROGRAM | Admitting: STUDENT IN AN ORGANIZED HEALTH CARE EDUCATION/TRAINING PROGRAM
Payer: COMMERCIAL

## 2023-05-29 VITALS — BODY MASS INDEX: 39.99 KG/M2 | WEIGHT: 240 LBS | HEIGHT: 65 IN

## 2023-05-29 DIAGNOSIS — Z88.5: ICD-10-CM

## 2023-05-29 DIAGNOSIS — E11.9: ICD-10-CM

## 2023-05-29 DIAGNOSIS — Z88.8: ICD-10-CM

## 2023-05-29 DIAGNOSIS — Z79.84: ICD-10-CM

## 2023-05-29 DIAGNOSIS — Z79.899: ICD-10-CM

## 2023-05-29 DIAGNOSIS — F31.32: Primary | ICD-10-CM

## 2023-05-29 LAB
ALBUMIN SERPL BCP-MCNC: 3.7 G/DL (ref 3.4–5)
ALBUMIN/GLOB SERPL: 1.1 {RATIO} (ref 0.8–1.8)
ALT SERPL W P-5'-P-CCNC: 26 U/L (ref 12–78)
ANION GAP SERPL CALCULATED.4IONS-SCNC: 8 MMOL/L (ref 6–16)
APAP SERPL-MCNC: <2 UG/ML (ref 10–30)
AST SERPL W P-5'-P-CCNC: 18 U/L (ref 12–37)
BASOPHILS # BLD AUTO: 0.05 K/MM3 (ref 0–0.23)
BASOPHILS NFR BLD AUTO: 1 % (ref 0–2)
BILIRUB SERPL-MCNC: 0.3 MG/DL (ref 0.1–1)
BILIRUB UR QL STRIP: (no result)
BUN SERPL-MCNC: 12 MG/DL (ref 8–24)
CALCIUM SERPL-MCNC: 9.2 MG/DL (ref 8.5–10.1)
CANNABINOIDS UR QL: DETECTED
CHLORIDE SERPL-SCNC: 110 MMOL/L (ref 98–108)
CO2 SERPL-SCNC: 24 MMOL/L (ref 21–32)
CREAT SERPL-MCNC: 0.79 MG/DL (ref 0.4–1)
DEPRECATED RDW RBC AUTO: 43.1 FL (ref 35.1–46.3)
EOSINOPHIL # BLD AUTO: 0.04 K/MM3 (ref 0–0.68)
EOSINOPHIL NFR BLD AUTO: 1 % (ref 0–6)
ERYTHROCYTE [DISTWIDTH] IN BLOOD BY AUTOMATED COUNT: 13.2 % (ref 11.7–14.2)
ETHANOL SERPL-MCNC: <3 MG/DL
GLOBULIN SER CALC-MCNC: 3.5 G/DL (ref 2.2–4)
GLUCOSE SERPL-MCNC: 110 MG/DL (ref 70–99)
HCT VFR BLD AUTO: 44.3 % (ref 33–51)
HGB BLD-MCNC: 14.6 G/DL (ref 11.5–16)
IMM GRANULOCYTES # BLD AUTO: 0.01 K/MM3 (ref 0–0.1)
IMM GRANULOCYTES NFR BLD AUTO: 0 % (ref 0–1)
KETONES UR STRIP-MCNC: (no result) MG/DL
LEUKOCYTE ESTERASE UR QL STRIP: (no result)
LYMPHOCYTES # BLD AUTO: 2.22 K/MM3 (ref 0.84–5.2)
LYMPHOCYTES NFR BLD AUTO: 26 % (ref 21–46)
MCHC RBC AUTO-ENTMCNC: 33 G/DL (ref 31.5–36.5)
MCV RBC AUTO: 90 FL (ref 80–100)
MONOCYTES # BLD AUTO: 0.56 K/MM3 (ref 0.16–1.47)
MONOCYTES NFR BLD AUTO: 6 % (ref 4–13)
NEUTROPHILS # BLD AUTO: 5.83 K/MM3 (ref 1.96–9.15)
NEUTROPHILS NFR BLD AUTO: 67 % (ref 41–73)
NRBC # BLD AUTO: 0 K/MM3 (ref 0–0.02)
NRBC BLD AUTO-RTO: 0 /100 WBC (ref 0–0.2)
PLATELET # BLD AUTO: 361 K/MM3 (ref 150–400)
POTASSIUM SERPL-SCNC: 4.1 MMOL/L (ref 3.5–5.5)
PROT SERPL-MCNC: 7.2 G/DL (ref 6.4–8.2)
PROT UR STRIP-MCNC: (no result) MG/DL
RBC #/AREA URNS HPF: (no result) /HPF (ref 0–2)
SALICYLATES SERPL-MCNC: <1.7 MG/DL (ref 2.8–20)
SODIUM SERPL-SCNC: 142 MMOL/L (ref 136–145)
SP GR SPEC: 1.02 (ref 1–1.02)
UROBILINOGEN UR STRIP-MCNC: (no result) MG/DL
WBC #/AREA URNS HPF: (no result) /HPF (ref 0–5)

## 2023-05-29 PROCEDURE — G0480 DRUG TEST DEF 1-7 CLASSES: HCPCS

## 2023-05-29 PROCEDURE — A9270 NON-COVERED ITEM OR SERVICE: HCPCS

## 2023-05-30 VITALS — SYSTOLIC BLOOD PRESSURE: 129 MMHG | DIASTOLIC BLOOD PRESSURE: 85 MMHG

## 2023-08-14 ENCOUNTER — HOSPITAL ENCOUNTER (OUTPATIENT)
Dept: HOSPITAL 95 - LAB | Age: 38
Discharge: HOME | End: 2023-08-14
Attending: OBSTETRICS & GYNECOLOGY
Payer: COMMERCIAL

## 2023-08-14 DIAGNOSIS — Z01.419: Primary | ICD-10-CM

## 2023-08-14 PROCEDURE — G0145 SCR C/V CYTO,THINLAYER,RESCR: HCPCS

## 2023-08-16 LAB — OTHER STN SPEC: (no result)

## 2024-11-27 ENCOUNTER — HOSPITAL ENCOUNTER (OUTPATIENT)
Dept: HOSPITAL 95 - LAB | Age: 39
Discharge: HOME | End: 2024-11-27
Attending: STUDENT IN AN ORGANIZED HEALTH CARE EDUCATION/TRAINING PROGRAM
Payer: COMMERCIAL

## 2024-11-27 DIAGNOSIS — R30.0: Primary | ICD-10-CM

## 2025-04-06 ENCOUNTER — HOSPITAL ENCOUNTER (OUTPATIENT)
Dept: HOSPITAL 95 - ER | Age: 40
Setting detail: OBSERVATION
LOS: 1 days | Discharge: HOME | End: 2025-04-07
Attending: FAMILY MEDICINE | Admitting: FAMILY MEDICINE
Payer: COMMERCIAL

## 2025-04-06 VITALS — WEIGHT: 255.3 LBS | HEIGHT: 65 IN | BODY MASS INDEX: 42.53 KG/M2

## 2025-04-06 VITALS — DIASTOLIC BLOOD PRESSURE: 91 MMHG | SYSTOLIC BLOOD PRESSURE: 141 MMHG

## 2025-04-06 VITALS — SYSTOLIC BLOOD PRESSURE: 140 MMHG | DIASTOLIC BLOOD PRESSURE: 89 MMHG

## 2025-04-06 DIAGNOSIS — Z79.899: ICD-10-CM

## 2025-04-06 DIAGNOSIS — F31.81: ICD-10-CM

## 2025-04-06 DIAGNOSIS — F17.210: ICD-10-CM

## 2025-04-06 DIAGNOSIS — K31.89: ICD-10-CM

## 2025-04-06 DIAGNOSIS — F41.9: ICD-10-CM

## 2025-04-06 DIAGNOSIS — Z88.8: ICD-10-CM

## 2025-04-06 DIAGNOSIS — E11.9: ICD-10-CM

## 2025-04-06 DIAGNOSIS — Z90.49: ICD-10-CM

## 2025-04-06 DIAGNOSIS — K21.9: ICD-10-CM

## 2025-04-06 DIAGNOSIS — K52.9: ICD-10-CM

## 2025-04-06 DIAGNOSIS — E78.5: ICD-10-CM

## 2025-04-06 DIAGNOSIS — K22.11: Primary | ICD-10-CM

## 2025-04-06 DIAGNOSIS — I10: ICD-10-CM

## 2025-04-06 DIAGNOSIS — Z88.5: ICD-10-CM

## 2025-04-06 DIAGNOSIS — Z90.710: ICD-10-CM

## 2025-04-06 LAB
ALBUMIN SERPL BCP-MCNC: 3.4 G/DL (ref 3.4–5)
BASOPHILS # BLD AUTO: 0.07 K/MM3 (ref 0–0.23)
BASOPHILS NFR BLD AUTO: 1 % (ref 0–2)
BILIRUB SERPL-MCNC: 0.5 MG/DL (ref 0.1–1)
CALCIUM SERPL-MCNC: 8.4 MG/DL (ref 8.5–10.1)
CREAT SERPL-MCNC: 0.76 MG/DL (ref 0.4–1)
DEPRECATED RDW RBC AUTO: 43.1 FL (ref 35.1–46.3)
EOSINOPHIL # BLD AUTO: 0.12 K/MM3 (ref 0–0.68)
EOSINOPHIL NFR BLD AUTO: 1 % (ref 0–6)
ERYTHROCYTE [DISTWIDTH] IN BLOOD BY AUTOMATED COUNT: 13.2 % (ref 11.7–14.2)
GLOBULIN SER CALC-MCNC: 3.5 G/DL (ref 2.2–4)
HCT VFR BLD AUTO: 44.5 % (ref 33–51)
HGB BLD-MCNC: 14.9 G/DL (ref 11.5–16)
IMM GRANULOCYTES # BLD AUTO: 0.02 K/MM3 (ref 0–0.1)
IMM GRANULOCYTES NFR BLD AUTO: 0 % (ref 0–1)
LYMPHOCYTES # BLD AUTO: 3.22 K/MM3 (ref 0.84–5.2)
LYMPHOCYTES NFR BLD AUTO: 36 % (ref 21–46)
MCHC RBC AUTO-ENTMCNC: 33.5 G/DL (ref 31.5–36.5)
MCV RBC AUTO: 90 FL (ref 80–100)
MONOCYTES NFR BLD AUTO: 7 % (ref 4–13)
NEUTROPHILS # BLD AUTO: 5.01 K/MM3 (ref 1.96–9.15)
NEUTROPHILS NFR BLD AUTO: 56 % (ref 41–73)
PLATELET # BLD AUTO: 279 K/MM3 (ref 150–400)
POTASSIUM SERPL-SCNC: 4.1 MMOL/L (ref 3.5–5.5)
PROT SERPL-MCNC: 6.9 G/DL (ref 6.4–8.2)
PROT UR STRIP-MCNC: (no result) MG/DL
UROBILINOGEN UR STRIP-MCNC: (no result) MG/DL

## 2025-04-06 PROCEDURE — G0378 HOSPITAL OBSERVATION PER HR: HCPCS

## 2025-04-06 PROCEDURE — A9270 NON-COVERED ITEM OR SERVICE: HCPCS

## 2025-04-07 VITALS — SYSTOLIC BLOOD PRESSURE: 131 MMHG | DIASTOLIC BLOOD PRESSURE: 91 MMHG

## 2025-04-07 VITALS — SYSTOLIC BLOOD PRESSURE: 121 MMHG | DIASTOLIC BLOOD PRESSURE: 83 MMHG

## 2025-04-07 VITALS — SYSTOLIC BLOOD PRESSURE: 138 MMHG | DIASTOLIC BLOOD PRESSURE: 96 MMHG

## 2025-04-07 VITALS — SYSTOLIC BLOOD PRESSURE: 138 MMHG | DIASTOLIC BLOOD PRESSURE: 91 MMHG

## 2025-04-07 VITALS — SYSTOLIC BLOOD PRESSURE: 142 MMHG | DIASTOLIC BLOOD PRESSURE: 97 MMHG

## 2025-04-07 LAB
ALBUMIN SERPL BCP-MCNC: 3.3 G/DL (ref 3.4–5)
ALBUMIN/GLOB SERPL: 0.9 {RATIO} (ref 0.8–1.8)
BASOPHILS # BLD AUTO: 0.08 K/MM3 (ref 0–0.23)
BASOPHILS NFR BLD AUTO: 1 % (ref 0–2)
BILIRUB SERPL-MCNC: 0.4 MG/DL (ref 0.1–1)
CALCIUM SERPL-MCNC: 8.5 MG/DL (ref 8.5–10.1)
CREAT SERPL-MCNC: 0.79 MG/DL (ref 0.4–1)
DEPRECATED RDW RBC AUTO: 45.2 FL (ref 35.1–46.3)
EOSINOPHIL # BLD AUTO: 0.22 K/MM3 (ref 0–0.68)
EOSINOPHIL NFR BLD AUTO: 2 % (ref 0–6)
ERYTHROCYTE [DISTWIDTH] IN BLOOD BY AUTOMATED COUNT: 13.4 % (ref 11.7–14.2)
GLOBULIN SER CALC-MCNC: 3.5 G/DL (ref 2.2–4)
HCT VFR BLD AUTO: 44.6 % (ref 33–51)
HGB BLD-MCNC: 14.9 G/DL (ref 11.5–16)
IMM GRANULOCYTES # BLD AUTO: 0.03 K/MM3 (ref 0–0.1)
IMM GRANULOCYTES NFR BLD AUTO: 0 % (ref 0–1)
LYMPHOCYTES # BLD AUTO: 3.87 K/MM3 (ref 0.84–5.2)
LYMPHOCYTES NFR BLD AUTO: 40 % (ref 21–46)
MCHC RBC AUTO-ENTMCNC: 33.4 G/DL (ref 31.5–36.5)
MCV RBC AUTO: 92 FL (ref 80–100)
MONOCYTES # BLD AUTO: 0.73 K/MM3 (ref 0.16–1.47)
MONOCYTES NFR BLD AUTO: 8 % (ref 4–13)
NEUTROPHILS # BLD AUTO: 4.77 K/MM3 (ref 1.96–9.15)
NEUTROPHILS NFR BLD AUTO: 49 % (ref 41–73)
PLATELET # BLD AUTO: 286 K/MM3 (ref 150–400)
POTASSIUM SERPL-SCNC: 3.7 MMOL/L (ref 3.5–5.5)
PROT SERPL-MCNC: 6.8 G/DL (ref 6.4–8.2)

## 2025-04-07 PROCEDURE — 0DB98ZX EXCISION OF DUODENUM, VIA NATURAL OR ARTIFICIAL OPENING ENDOSCOPIC, DIAGNOSTIC: ICD-10-PCS | Performed by: INTERNAL MEDICINE

## 2025-04-07 PROCEDURE — 0DBA8ZX EXCISION OF JEJUNUM, VIA NATURAL OR ARTIFICIAL OPENING ENDOSCOPIC, DIAGNOSTIC: ICD-10-PCS | Performed by: INTERNAL MEDICINE
